# Patient Record
Sex: MALE | Race: WHITE | NOT HISPANIC OR LATINO | Employment: UNEMPLOYED | URBAN - NONMETROPOLITAN AREA
[De-identification: names, ages, dates, MRNs, and addresses within clinical notes are randomized per-mention and may not be internally consistent; named-entity substitution may affect disease eponyms.]

---

## 2017-05-24 ENCOUNTER — OFFICE VISIT (OUTPATIENT)
Dept: UROLOGY | Facility: CLINIC | Age: 62
End: 2017-05-24

## 2017-05-24 DIAGNOSIS — R33.9 INCOMPLETE EMPTYING OF BLADDER: ICD-10-CM

## 2017-05-24 DIAGNOSIS — N40.1 BENIGN NON-NODULAR PROSTATIC HYPERPLASIA WITH LOWER URINARY TRACT SYMPTOMS: ICD-10-CM

## 2017-05-24 DIAGNOSIS — R35.0 URINARY FREQUENCY: Primary | ICD-10-CM

## 2017-05-24 LAB
BILIRUB BLD-MCNC: NEGATIVE MG/DL
CLARITY, POC: CLEAR
COLOR UR: YELLOW
GLUCOSE UR STRIP-MCNC: NEGATIVE MG/DL
KETONES UR QL: NEGATIVE
LEUKOCYTE EST, POC: NEGATIVE
NITRITE UR-MCNC: NEGATIVE MG/ML
PH UR: 5 [PH] (ref 5–8)
PROT UR STRIP-MCNC: ABNORMAL MG/DL
RBC # UR STRIP: NEGATIVE /UL
SP GR UR: 1.01 (ref 1–1.03)
UROBILINOGEN UR QL: NORMAL

## 2017-05-24 PROCEDURE — 51798 US URINE CAPACITY MEASURE: CPT | Performed by: UROLOGY

## 2017-05-24 PROCEDURE — 81003 URINALYSIS AUTO W/O SCOPE: CPT | Performed by: UROLOGY

## 2017-05-24 PROCEDURE — 99203 OFFICE O/P NEW LOW 30 MIN: CPT | Performed by: UROLOGY

## 2019-07-22 DIAGNOSIS — R06.02 SOB (SHORTNESS OF BREATH): Primary | ICD-10-CM

## 2019-07-29 ENCOUNTER — HOSPITAL ENCOUNTER (OUTPATIENT)
Dept: GENERAL RADIOLOGY | Facility: HOSPITAL | Age: 64
Discharge: HOME | End: 2019-07-29
Admitting: INTERNAL MEDICINE

## 2019-07-29 PROCEDURE — 71046 X-RAY EXAM CHEST 2 VIEWS: CPT

## 2019-07-29 PROCEDURE — 71046 X-RAY EXAM CHEST 2 VIEWS: CPT | Performed by: RADIOLOGY

## 2019-07-30 ENCOUNTER — OFFICE VISIT (OUTPATIENT)
Dept: PULMONOLOGY | Facility: CLINIC | Age: 64
End: 2019-07-30

## 2019-07-30 VITALS
BODY MASS INDEX: 27.55 KG/M2 | HEART RATE: 71 BPM | SYSTOLIC BLOOD PRESSURE: 129 MMHG | DIASTOLIC BLOOD PRESSURE: 77 MMHG | HEIGHT: 69 IN | OXYGEN SATURATION: 95 % | WEIGHT: 186 LBS | TEMPERATURE: 98 F

## 2019-07-30 DIAGNOSIS — J60 CWP (COALWORKERS PNEUMOCONIOSIS) (HCC): Primary | ICD-10-CM

## 2019-07-30 LAB
FEV1: 2.61 LITERS
FVC VOL RESPIRATORY: 3.46 LITERS

## 2019-07-30 PROCEDURE — 94010 BREATHING CAPACITY TEST: CPT | Performed by: INTERNAL MEDICINE

## 2019-07-30 PROCEDURE — 99202 OFFICE O/P NEW SF 15 MIN: CPT | Performed by: INTERNAL MEDICINE

## 2019-07-30 RX ORDER — LEVOTHYROXINE SODIUM 0.03 MG/1
TABLET ORAL
COMMUNITY
Start: 2019-07-01

## 2019-07-30 RX ORDER — TAMSULOSIN HYDROCHLORIDE 0.4 MG/1
CAPSULE ORAL
COMMUNITY
Start: 2019-07-01 | End: 2022-10-27

## 2019-07-30 RX ORDER — HYDROXYUREA 200 MG/1
CAPSULE ORAL
COMMUNITY
Start: 2019-07-10 | End: 2023-01-25

## 2019-07-30 RX ORDER — TERBINAFINE HYDROCHLORIDE 250 MG/1
TABLET ORAL
COMMUNITY
Start: 2019-06-07

## 2019-07-30 RX ORDER — METOPROLOL SUCCINATE 100 MG/1
TABLET, EXTENDED RELEASE ORAL
COMMUNITY
Start: 2019-07-01

## 2019-07-30 RX ORDER — FENOFIBRATE 145 MG/1
TABLET, COATED ORAL
COMMUNITY

## 2019-07-30 RX ORDER — HYDROXYUREA 500 MG/1
CAPSULE ORAL
COMMUNITY
Start: 2019-07-01 | End: 2023-01-25

## 2019-07-30 RX ORDER — LISINOPRIL 2.5 MG/1
TABLET ORAL
COMMUNITY
End: 2023-01-25

## 2019-07-30 ASSESSMENT — PULMONARY FUNCTION TESTS
FVC: 3.46
FEV1: 2.61

## 2019-07-30 NOTE — PROGRESS NOTES
Subjective   Chief Complaint   Patient presents with   • Black Lung       Zane De Leon is a 63 y.o. 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 Morales in [place] for diabetes and hypertension and gives history of having polycythemia,.  On metformin and lisinopril and Ventolin inhaler    Review of Systems minimal cough and some shortness of breath on exertion uses Ventolin inhaler occasionally    Family History   Problem Relation Age of Onset   • Heart disease Father    • Hypertension Father    • Heart disease Mother    • Hypertension Mother    • Kidney disease Mother    • Heart disease Sister        Past Medical History:   Diagnosis Date   • Acid reflux    • Diabetes mellitus (CMS/HCC)    • High cholesterol    • Hypertension        No past surgical history on file.    Social History     Socioeconomic History   • Marital status:      Spouse name: Not on file   • Number of children: Not on file   • Years of education: Not on file   • Highest education level: Not on file   Tobacco Use   • Smoking status: Former Smoker   • Smokeless tobacco: Former User   Substance and Sexual Activity   • Alcohol use: No   • Drug use: No        Physical Exam: No acute distress vital signs are stable blood pressure 109/77 O2 sat 95% lungs clear heart regular no clubbing cyanosis or edema    PFT: Borderline normal FVC 78 FEV1 78%    Imaging: Chest x-ray shows a small densities throughout both lung PQ type and rate of 2/3    Other Labs:       ASSESSMENT: Workers pneumoconiosis stage II/III        Recommendations: Continue albuterol inhaler as needed reminded of getting Flu vaccine annually pneumonia vaccine per protocol    Follow up: Dr. Haji of the  family physician in [place] suggested getting chest x-ray once a year in August after his birthday and return to our office as needed I explained to him that I will not be working next year in August during the office somebody else will be here

## 2022-05-26 ENCOUNTER — OFFICE VISIT (OUTPATIENT)
Dept: PULMONOLOGY | Facility: CLINIC | Age: 67
End: 2022-05-26

## 2022-05-26 VITALS
WEIGHT: 188 LBS | HEIGHT: 69 IN | DIASTOLIC BLOOD PRESSURE: 82 MMHG | OXYGEN SATURATION: 98 % | SYSTOLIC BLOOD PRESSURE: 122 MMHG | BODY MASS INDEX: 27.85 KG/M2 | TEMPERATURE: 97.7 F | HEART RATE: 75 BPM

## 2022-05-26 DIAGNOSIS — R53.83 FATIGUE, UNSPECIFIED TYPE: ICD-10-CM

## 2022-05-26 DIAGNOSIS — D45 POLYCYTHEMIA VERA: ICD-10-CM

## 2022-05-26 DIAGNOSIS — J60 BLACK LUNG: Primary | ICD-10-CM

## 2022-05-26 PROCEDURE — 99214 OFFICE O/P EST MOD 30 MIN: CPT | Performed by: INTERNAL MEDICINE

## 2022-05-26 NOTE — PROGRESS NOTES
Subjective    Zane De Leon presents for the following Black Lung      History of Present Illness   Were you born premature?  no    Any Childhood infections? no      Breathing problems when you were a child? no    Any childhood allergies?    no             At what age did you begin smoking? 19    Smoking marijuana? no    Any IV drugs? no    How many packs per day? Socially    Lung Function Test? yes  Chest X-Ray? yes    CT Chest? yes Allergy Test? no    Family hx of Lung disease or Lung Cancer?yes    If FHx is posivitive for lung cancer, what is the relationship of the family member? sister(s)    Any hospitalization in the last year? no    How far can you walk without getting short of breath? 100 feet    Any coughing? yes    Any wheezing? yes    Acid Reflux? yes    Do you snore? yes    Daytime Fatigue? yes    Any pets? yes   Any pet allergies? no    Occupation? Retired     Have you been exposed to any chemicals at your job? yes    What inhalers are you currently using? None    Have you had the Influenza Vaccine? yes    Would you like to receive this Vaccine today? no    Have you had the Pneumonia Vaccine?  yes   Would you like to receive this Vaccine today? no  Above-mentioned questionnaire was reviewed by me in great detail.  Patient last saw pulmonologist roughly 3 years back.  As per the patient he did not have any hospitalization or any exacerbation of his symptoms.  Patient carries a diagnosis of black lung as per the records  Imaging: Chest x-ray shows a small densities throughout both lung PQ type and rate of 2/3   Workers pneumoconiosis stage II/III    In the hematology oncology office patient was told not to use albuterol inhaler as it can cause polycythemia vera.  To which I do not agree.  I asked him to provide us records from the hematology oncology office.  I did tell him that chronic hypoxia can for sure because polycythemia vera.  Patient's pulse ox today was 98%.    Review of Systems    Constitutional: Positive for fatigue. Negative for activity change, appetite change, chills and unexpected weight change.   HENT: Negative for congestion, postnasal drip and rhinorrhea.    Respiratory: Positive for cough, shortness of breath and wheezing. Negative for apnea and chest tightness.    Cardiovascular: Negative for chest pain, palpitations and leg swelling.   Gastrointestinal: Negative for nausea.   Musculoskeletal: Negative for gait problem.   Skin: Negative for pallor.   Allergic/Immunologic: Negative for environmental allergies.   Neurological: Negative for syncope.   Psychiatric/Behavioral: Negative for confusion. The patient is not nervous/anxious.        Active Problems:  Problem List Items Addressed This Visit        Hematology and Neoplasia    Polycythemia vera (HCC)       Pulmonary and Pneumonias    Black lung (HCC) - Primary    Relevant Orders    Full Pulmonary Function Test With Bronchodilator       Symptoms and Signs    Fatigue    Relevant Orders    Home Sleep Study          Past Medical History:  Past Medical History:   Diagnosis Date   • Acid reflux    • Diabetes mellitus (HCC)    • High cholesterol    • Hypertension        Family History:  Family History   Problem Relation Age of Onset   • Heart disease Father    • Hypertension Father    • Heart disease Mother    • Hypertension Mother    • Kidney disease Mother    • Heart disease Sister        Social History:  Social History     Tobacco Use   • Smoking status: Former Smoker   • Smokeless tobacco: Former User   Substance Use Topics   • Alcohol use: No       Current Medications:  Current Outpatient Medications   Medication Sig Dispense Refill   • DROXIA 200 MG capsule      • fenofibrate (TRICOR) 145 MG tablet fenofibrate nanocrystallized 145 mg tablet     • hydroxyurea (HYDREA) 500 MG capsule      • levothyroxine (SYNTHROID, LEVOTHROID) 25 MCG tablet      • lisinopril (PRINIVIL,ZESTRIL) 2.5 MG tablet lisinopril 2.5 mg tablet     • metFORMIN  "(GLUCOPHAGE) 500 MG tablet metformin 500 mg tablet     • metoprolol succinate XL (TOPROL-XL) 100 MG 24 hr tablet      • tamsulosin (FLOMAX) 0.4 MG capsule 24 hr capsule      • terbinafine (lamiSIL) 250 MG tablet        No current facility-administered medications for this visit.       Allergies:  No Known Allergies    Vitals:  /82   Pulse 75   Temp 97.7 °F (36.5 °C) (Temporal)   Ht 175.3 cm (69\")   Wt 85.3 kg (188 lb)   SpO2 98%   BMI 27.76 kg/m²     Imaging:    Imaging Results (Most Recent)     None          Pulmonary Functions Testing Results:    FEV1   Date Value Ref Range Status   07/30/2019 2.61 liters Final     FVC   Date Value Ref Range Status   07/30/2019 3.46 liters Final       Results for orders placed or performed in visit on 07/30/19   Pulmonary Function Test   Result Value Ref Range    FEV1 2.61 liters    FVC 3.46 liters       Objective   Physical Exam   General- normal in appearance, not in any acute distress    HEENT- pupils equally reactive to light, normal in size, no scleral icterus    Neck-supple    Respiratory-respirations normal-on auscultation no wheezing no crackles,     Cardiovascular-  Normal S1 and S2. No S3, S4 or murmurs. No JVD, no carotid bruit and no edema, pulses normal bilaterally     GI-nontender nondistended bowel sounds positive    CNS-nonfocal    Musculoskeletal -no edema  Extremities- no obvious deformity noticed     Psychiatric-mood good, good eye contact, alert awake oriented  Skin- no visible rash         Assessment & Plan      Black lung-as per the records-  Imaging: Chest x-ray shows a small densities throughout both lung PQ type and rate of 2/3  Workers pneumoconiosis stage II/III    We will get PFTs to evaluate the lungs and see if patient has any obstructive or restrictive lung disease.    And prescribe him inhalers accordingly    Daytime fatigue and tiredness-will get sleep study.  This will also be helpful if patient is having any nighttime hypoxemia which " can lead to polycythemia vera.    Polycythemia vera-continue current treatment with hematology oncology.  Patient is on hydroxyurea.      ICD-10-CM ICD-9-CM   1. Black lung (HCC)  J60 500   2. Fatigue, unspecified type  R53.83 780.79   3. Polycythemia vera (HCC)  D45 238.4       Return in about 3 months (around 8/26/2022).

## 2022-06-14 ENCOUNTER — HOSPITAL ENCOUNTER (OUTPATIENT)
Dept: RESPIRATORY THERAPY | Facility: HOSPITAL | Age: 67
Discharge: HOME | End: 2022-06-14
Admitting: INTERNAL MEDICINE

## 2022-06-14 VITALS — OXYGEN SATURATION: 99 % | RESPIRATION RATE: 16 BRPM | HEART RATE: 66 BPM

## 2022-06-14 DIAGNOSIS — J60 BLACK LUNG: ICD-10-CM

## 2022-06-14 PROCEDURE — 94060 EVALUATION OF WHEEZING: CPT

## 2022-06-14 PROCEDURE — 94060 EVALUATION OF WHEEZING: CPT | Performed by: INTERNAL MEDICINE

## 2022-06-14 PROCEDURE — 94664 DEMO&/EVAL PT USE INHALER: CPT

## 2022-06-14 PROCEDURE — 94726 PLETHYSMOGRAPHY LUNG VOLUMES: CPT

## 2022-06-14 PROCEDURE — 94640 AIRWAY INHALATION TREATMENT: CPT

## 2022-06-14 PROCEDURE — 94729 DIFFUSING CAPACITY: CPT

## 2022-06-14 PROCEDURE — 94726 PLETHYSMOGRAPHY LUNG VOLUMES: CPT | Performed by: INTERNAL MEDICINE

## 2022-06-14 PROCEDURE — 94729 DIFFUSING CAPACITY: CPT | Performed by: INTERNAL MEDICINE

## 2022-06-14 PROCEDURE — 94799 UNLISTED PULMONARY SVC/PX: CPT

## 2022-06-14 RX ORDER — ALBUTEROL SULFATE 2.5 MG/3ML
2.5 SOLUTION RESPIRATORY (INHALATION) ONCE
Status: COMPLETED | OUTPATIENT
Start: 2022-06-14 | End: 2022-06-14

## 2022-06-14 RX ADMIN — ALBUTEROL SULFATE 2.5 MG: 2.5 SOLUTION RESPIRATORY (INHALATION) at 10:42

## 2022-10-27 ENCOUNTER — OFFICE VISIT (OUTPATIENT)
Dept: PULMONOLOGY | Facility: CLINIC | Age: 67
End: 2022-10-27

## 2022-10-27 VITALS
HEIGHT: 69 IN | SYSTOLIC BLOOD PRESSURE: 118 MMHG | OXYGEN SATURATION: 98 % | HEART RATE: 80 BPM | RESPIRATION RATE: 18 BRPM | DIASTOLIC BLOOD PRESSURE: 72 MMHG | TEMPERATURE: 97.8 F | BODY MASS INDEX: 25.18 KG/M2 | WEIGHT: 170 LBS

## 2022-10-27 DIAGNOSIS — J60 BLACK LUNG: Primary | ICD-10-CM

## 2022-10-27 DIAGNOSIS — D45 POLYCYTHEMIA VERA: ICD-10-CM

## 2022-10-27 DIAGNOSIS — E66.3 OVERWEIGHT: ICD-10-CM

## 2022-10-27 DIAGNOSIS — J44.9 CHRONIC OBSTRUCTIVE PULMONARY DISEASE, UNSPECIFIED COPD TYPE: ICD-10-CM

## 2022-10-27 PROCEDURE — 99214 OFFICE O/P EST MOD 30 MIN: CPT | Performed by: NURSE PRACTITIONER

## 2022-10-27 RX ORDER — ASPIRIN 81 MG/1
81 TABLET ORAL DAILY
COMMUNITY

## 2022-10-27 NOTE — PROGRESS NOTES
"Chief Complaint  black lung (Follow up)    Subjective        Zane De Leon presents to [facility] PULMONARY & CRITICAL CARE MEDICINE  History of Present Illness     Mr. De Leon is a 67 year old male with a medical history significant for GERD, diabetes, hyperlipidemia, hypertension, black lung and polycythemia vera.    He presents today for follow-up on black line.  He states that since his last visit he has had hernia surgery and then has been sick with congestion and cough.  He tells me that he saw his PCP who gave him some medication for this.  He reports that since having surgery and being sick he has not had much of an appetite but is starting to feel better.  He underwent PFT since his last visit which does show a moderate obstruction with no bronchodilator response.  He states that he did not have his sleep study completed.  He states that he feels that he sleeps well at nighttime and does not have issues with this.  He also tells me that he follows with Dr. Francis for his polycythemia vera and underwent a bone marrow test about a month ago.          Objective   Vital Signs:  /72 (BP Location: Left arm, Patient Position: Sitting, Cuff Size: Adult)   Pulse 80   Temp 97.8 °F (36.6 °C) (Temporal)   Resp 18   Ht 175.3 cm (69\")   Wt 77.1 kg (170 lb)   SpO2 98%   BMI 25.10 kg/m²   Estimated body mass index is 25.1 kg/m² as calculated from the following:    Height as of this encounter: 175.3 cm (69\").    Weight as of this encounter: 77.1 kg (170 lb).    BMI is >= 25 and <30. (Overweight) The following options were offered after discussion;: exercise counseling/recommendations and nutrition counseling/recommendations      Physical Exam     GENERAL APPEARANCE: Well developed, well nourished, alert and cooperative, and appears to be in no acute distress.    HEAD: normocephalic. Atraumatic.    EYES: PERRL, EOMI. Vision is grossly intact.    THROAT: Oral cavity and pharynx normal. No " inflammation, swelling, exudate, or lesions.     NECK: Neck supple.  No thyromegaly.    CARDIAC: Normal S1 and S2. No S3, S4 or murmurs. Rhythm is regular.     RESPIRATORY:Bilateral air entry positive. Bilateral diminished breath sounds. No wheezing, crackles or rhonchi noted.    GI: Positive bowel sounds. Soft, nondistended, nontender.     MUSCULOSKELETAL: No significant deformity or joint abnormality. No edema. Peripheral pulses intact. No varicosities.    NEUROLOGICAL: Strength and sensation symmetric and intact throughout.     PSYCHIATRIC: The mental examination revealed the patient was oriented to person, place, and time.     Result Review :  The following data was reviewed by: JOSE Freed on 10/27/2022:               Assessment and Plan   Diagnoses and all orders for this visit:    1. Black lung (HCC) (Primary)    2. Polycythemia vera (HCC)    3. Chronic obstructive pulmonary disease, unspecified COPD type (HCC)  -     Overnight Sleep Oximetry Study; Future    4. Overweight         Imaging was reviewed.  Chest x-ray shows small densities throughout both lungs.  Per Dr. Linton's last note, PQ type and rate 2/3.  Coal workers pneumoconiosis stage II/III.    PFT was reviewed and shows moderate obstruction with no significant bronchodilator response.  He is a former smoker, quitting in 1983.  He tells me that his hematologist told him that he cannot use any inhalers as it was causing his blood levels to go back.  I will obtain records from Dr. Francis and discussed with him the possibility of starting him on inhalers for shortness of breath and COPD.    He did not have sleep study completed.  He tells me that he feels that he does not have any trouble with his sleep.  Explained to him that nighttime hypoxemia can lead to polycythemia..  He is willing to undergo overnight pulse oximetry study to assess oxygen saturations during sleep.      He follows with Dr. Francis for polycythemia vera.  He is on  hydroxyurea.            Follow Up   Return in about 3 months (around 1/27/2023).  Patient was given instructions and counseling regarding his condition or for health maintenance advice. Please see specific information pulled into the AVS if appropriate.

## 2022-11-17 ENCOUNTER — TELEPHONE (OUTPATIENT)
Dept: PULMONOLOGY | Facility: CLINIC | Age: 67
End: 2022-11-17

## 2022-11-17 NOTE — TELEPHONE ENCOUNTER
----- Message from JOSE Freed sent at 11/16/2022  3:54 PM EST -----  Will you let him know that his overnight oxygen study showed no oxygen desaturations that would require him to need oxygen.      ----- Message -----  From: Devonte Higgins Incoming  Sent: 11/16/2022   3:16 PM EST  To: JOSE Freed

## 2022-11-17 NOTE — TELEPHONE ENCOUNTER
Called to speak with patient to report overnight pulse ox study results. Patients results were normal and did not show that he will require oxygen to sleep. No answer and unable to leave a message as there was no voicemail.

## 2023-01-25 ENCOUNTER — OFFICE VISIT (OUTPATIENT)
Dept: PULMONOLOGY | Facility: CLINIC | Age: 68
End: 2023-01-25
Payer: OTHER MISCELLANEOUS

## 2023-01-25 VITALS
BODY MASS INDEX: 25.92 KG/M2 | HEIGHT: 69 IN | OXYGEN SATURATION: 98 % | SYSTOLIC BLOOD PRESSURE: 108 MMHG | TEMPERATURE: 98.2 F | DIASTOLIC BLOOD PRESSURE: 70 MMHG | HEART RATE: 87 BPM | WEIGHT: 175 LBS

## 2023-01-25 DIAGNOSIS — E66.3 OVERWEIGHT: ICD-10-CM

## 2023-01-25 DIAGNOSIS — J44.9 CHRONIC OBSTRUCTIVE PULMONARY DISEASE, UNSPECIFIED COPD TYPE: ICD-10-CM

## 2023-01-25 DIAGNOSIS — D45 POLYCYTHEMIA VERA: ICD-10-CM

## 2023-01-25 DIAGNOSIS — J60 BLACK LUNG: Primary | ICD-10-CM

## 2023-01-25 PROCEDURE — 99214 OFFICE O/P EST MOD 30 MIN: CPT | Performed by: NURSE PRACTITIONER

## 2023-01-25 RX ORDER — OMEPRAZOLE 20 MG/1
CAPSULE, DELAYED RELEASE ORAL
COMMUNITY
Start: 2022-11-22

## 2023-01-25 RX ORDER — FLUOROURACIL 50 MG/G
CREAM TOPICAL
COMMUNITY
Start: 2023-01-23

## 2023-01-25 RX ORDER — LISINOPRIL 20 MG/1
20 TABLET ORAL DAILY
COMMUNITY
Start: 2022-11-22

## 2023-01-25 RX ORDER — ALBUTEROL SULFATE 90 UG/1
2 AEROSOL, METERED RESPIRATORY (INHALATION) EVERY 4 HOURS PRN
Qty: 6.7 G | Refills: 5 | Status: SHIPPED | OUTPATIENT
Start: 2023-01-25

## 2023-01-25 RX ORDER — FINASTERIDE 5 MG/1
5 TABLET, FILM COATED ORAL DAILY
COMMUNITY
Start: 2022-11-22

## 2023-01-25 RX ORDER — TAMSULOSIN HYDROCHLORIDE 0.4 MG/1
1 CAPSULE ORAL DAILY
COMMUNITY
Start: 2022-12-05

## 2023-01-25 NOTE — PROGRESS NOTES
"Chief Complaint  Black lung (HCC)    Subjective        Zane De Leon presents to [facility] PULMONARY & CRITICAL CARE MEDICINE  History of Present Illness     Mr. De Leon is a 67 year old male with a medical history significant for GERD, COPD, coal worker's pneumoconiosis, diabetes, hyperlipidemia, and hypertension.    He presents today for follow up on coal workers pneumoconiosis.  He states that he is doing well.  He reports that his shortness of breath is at baseline.  He tells me that he current does not have any inhalers.  He is a former smoker.    Objective   Vital Signs:  /70 (BP Location: Left arm, Patient Position: Sitting)   Pulse 87   Temp 98.2 °F (36.8 °C)   Ht 175.3 cm (69\")   Wt 79.4 kg (175 lb)   SpO2 98%   BMI 25.84 kg/m²   Estimated body mass index is 25.84 kg/m² as calculated from the following:    Height as of this encounter: 175.3 cm (69\").    Weight as of this encounter: 79.4 kg (175 lb).       BMI is >= 25 and <30. (Overweight) The following options were offered after discussion;: exercise counseling/recommendations and nutrition counseling/recommendations      Physical Exam     GENERAL APPEARANCE: Well developed, well nourished, alert and cooperative, and appears to be in no acute distress.    HEAD: normocephalic. Atraumatic.    EYES: PERRL, EOMI. Vision is grossly intact.    THROAT: Oral cavity and pharynx normal. No inflammation, swelling, exudate, or lesions.     NECK: Neck supple.  No thyromegaly.    CARDIAC: Normal S1 and S2. No S3, S4 or murmurs. Rhythm is regular.     RESPIRATORY:Bilateral air entry positive. Bilateral diminished breath sounds. No wheezing, crackles or rhonchi noted.    GI: Positive bowel sounds. Soft, nondistended, nontender.     MUSCULOSKELETAL: No significant deformity or joint abnormality. No edema. Peripheral pulses intact. No varicosities.    NEUROLOGICAL: Strength and sensation symmetric and intact throughout.     PSYCHIATRIC: The " mental examination revealed the patient was oriented to person, place, and time.     Result Review :  The following data was reviewed by: JOSE Freed on 01/25/2023:                   Assessment and Plan   Diagnoses and all orders for this visit:    1. Black lung (HCC) (Primary)    2. Chronic obstructive pulmonary disease, unspecified COPD type ([facility])    3. Overweight    4. Polycythemia vera ([facility])    Other orders  -     albuterol sulfate  (90 Base) MCG/ACT inhaler; Inhale 2 puffs Every 4 (Four) Hours As Needed for Wheezing.  Dispense: 6.7 g; Refill: 5           Reviewed pulmonary testing.  Will start him on albuterol as needed.    Per Dr. Linton's last note, PQ type and rate 2/3.  Coal workers pneumoconiosis stage II/III.    Overnight pulse oximetry was reviewed and noted to be normal.  He does not qualify for oxygen at this time.    He follows with Dr. Francis for polycythemia vera.       Follow Up   Return in about 6 months (around 7/25/2023).  Patient was given instructions and counseling regarding his condition or for health maintenance advice. Please see specific information pulled into the AVS if appropriate.

## 2023-02-02 ENCOUNTER — TELEPHONE (OUTPATIENT)
Dept: PULMONOLOGY | Facility: CLINIC | Age: 68
End: 2023-02-02
Payer: MEDICARE

## 2023-02-02 NOTE — TELEPHONE ENCOUNTER
----- Message from JOSE Freed sent at 2/2/2023 11:41 AM EST -----  Will you let him know that his overnight pulse oximetry was normal and he does not need oxygen.  ----- Message -----  From: Devonte Higgins Incoming  Sent: 2/1/2023   1:23 PM EST  To: JOSE Freed

## 2023-07-27 ENCOUNTER — OFFICE VISIT (OUTPATIENT)
Dept: PULMONOLOGY | Facility: CLINIC | Age: 68
End: 2023-07-27
Payer: OTHER MISCELLANEOUS

## 2023-07-27 VITALS
TEMPERATURE: 97.8 F | SYSTOLIC BLOOD PRESSURE: 118 MMHG | BODY MASS INDEX: 26.96 KG/M2 | WEIGHT: 182 LBS | HEIGHT: 69 IN | HEART RATE: 74 BPM | DIASTOLIC BLOOD PRESSURE: 70 MMHG | OXYGEN SATURATION: 97 %

## 2023-07-27 DIAGNOSIS — J44.9 CHRONIC OBSTRUCTIVE PULMONARY DISEASE, UNSPECIFIED COPD TYPE: ICD-10-CM

## 2023-07-27 DIAGNOSIS — D45 POLYCYTHEMIA VERA: ICD-10-CM

## 2023-07-27 DIAGNOSIS — J60 BLACK LUNG: Primary | ICD-10-CM

## 2023-07-27 DIAGNOSIS — E66.3 OVERWEIGHT: ICD-10-CM

## 2023-07-27 RX ORDER — DAPAGLIFLOZIN 10 MG/1
1 TABLET, FILM COATED ORAL DAILY
COMMUNITY
Start: 2023-06-07

## 2023-07-27 NOTE — PROGRESS NOTES
"Chief Complaint  black lung    Subjective        Zane De Leon presents to [facility] PULMONARY & CRITICAL CARE MEDICINE  History of Present Illness    Mr. De Leon is a 67 year old male with a medical history significant for black lung, COPD, diabetes, and hypertension.    He presents today for follow up on black lung and COPD. He states that for the most part his breathing has been at baseline. He does report shortness of breath with exertion.  He has has no exacerbations since his last visit.  He is currently using albuterol as needed.        Objective   Vital Signs:  /70 (BP Location: Left arm, Patient Position: Sitting)   Pulse 74   Temp 97.8 °F (36.6 °C)   Ht 175.3 cm (69\")   Wt 82.6 kg (182 lb)   SpO2 97%   BMI 26.88 kg/m²   Estimated body mass index is 26.88 kg/m² as calculated from the following:    Height as of this encounter: 175.3 cm (69\").    Weight as of this encounter: 82.6 kg (182 lb).               Physical Exam     GENERAL APPEARANCE: Well developed, well nourished, alert and cooperative, and appears to be in no acute distress.    HEAD: normocephalic. Atraumatic.    EYES: PERRL, EOMI. Vision is grossly intact.    THROAT: Oral cavity and pharynx normal. No inflammation, swelling, exudate, or lesions.     NECK: Neck supple.  No thyromegaly.    CARDIAC: Normal S1 and S2. No S3, S4 or murmurs. Rhythm is regular.     RESPIRATORY:Bilateral air entry positive. Bilateral diminished breath sounds. No wheezing, crackles or rhonchi noted.    GI: Positive bowel sounds. Soft, nondistended, nontender.     MUSCULOSKELETAL: No significant deformity or joint abnormality. No edema. Peripheral pulses intact. No varicosities.    NEUROLOGICAL: Strength and sensation symmetric and intact throughout.     PSYCHIATRIC: The mental examination revealed the patient was oriented to person, place, and time.     Result Review :  The following data was reviewed by: JOSE Freed on " 07/27/2023:                 Assessment and Plan   Diagnoses and all orders for this visit:    1. Black lung (Primary)    2. Chronic obstructive pulmonary disease, unspecified COPD type    3. Overweight    4. Polycythemia vera    Other orders  -     tiotropium bromide-olodaterol (STIOLTO RESPIMAT) 2.5-2.5 MCG/ACT aerosol solution inhaler; Inhale 2 puffs Daily.  Dispense: 4 g; Refill: 5          Per Dr. Linton's last note, PQ type and rate 2/3.  Coal workers pneumoconiosis stage II/III.      PFT shows a moderate obstruction with no significant bronchodilator response.    Continue albuterol as needed.  Stated him on stiolto once daily.    No oxygen desaturations were noted on overnight pulse oximetry study.    He follows with Dr. Francis for polycythemia vera.         Follow Up   Return in about 6 months (around 1/27/2024).  Patient was given instructions and counseling regarding his condition or for health maintenance advice. Please see specific information pulled into the AVS if appropriate.

## 2024-01-25 ENCOUNTER — OFFICE VISIT (OUTPATIENT)
Dept: PULMONOLOGY | Facility: CLINIC | Age: 69
End: 2024-01-25
Payer: OTHER MISCELLANEOUS

## 2024-01-25 VITALS
SYSTOLIC BLOOD PRESSURE: 110 MMHG | DIASTOLIC BLOOD PRESSURE: 78 MMHG | HEIGHT: 69 IN | OXYGEN SATURATION: 96 % | WEIGHT: 185 LBS | BODY MASS INDEX: 27.4 KG/M2 | HEART RATE: 98 BPM | TEMPERATURE: 96.9 F

## 2024-01-25 DIAGNOSIS — J44.9 CHRONIC OBSTRUCTIVE PULMONARY DISEASE, UNSPECIFIED COPD TYPE: ICD-10-CM

## 2024-01-25 DIAGNOSIS — J60 BLACK LUNG: Primary | ICD-10-CM

## 2024-01-25 DIAGNOSIS — D45 POLYCYTHEMIA VERA: ICD-10-CM

## 2024-01-25 DIAGNOSIS — E66.3 OVERWEIGHT: ICD-10-CM

## 2024-01-25 RX ORDER — METHYLPREDNISOLONE 4 MG/1
TABLET ORAL
COMMUNITY
Start: 2023-10-31

## 2024-01-25 RX ORDER — FOLIC ACID 1 MG/1
1 TABLET ORAL DAILY
COMMUNITY

## 2024-01-25 RX ORDER — LISINOPRIL 10 MG/1
1 TABLET ORAL DAILY
COMMUNITY
Start: 2024-01-23

## 2024-01-25 RX ORDER — METFORMIN HYDROCHLORIDE 500 MG/1
500 TABLET, FILM COATED, EXTENDED RELEASE ORAL
COMMUNITY

## 2024-01-25 RX ORDER — FENOFIBRATE 145 MG/1
145 TABLET, COATED ORAL DAILY
COMMUNITY

## 2024-01-25 RX ORDER — PRAVASTATIN SODIUM 10 MG
1 TABLET ORAL DAILY
COMMUNITY
Start: 2024-01-23

## 2024-01-25 NOTE — PROGRESS NOTES
"Chief Complaint  Black lung    Subjective        Zane De Leon presents to [facility] PULMONARY & CRITICAL CARE MEDICINE  History of Present Illness    Mr. De Leon is a 68 year old male with a medical history significant for COPD, black lung, diabetes, hyperlipidemia, and hypertension.    He presents today for follow up on black lung and COPD.  He states that he has been doing well since he was last seen.  He reports that his breathing has been at baseline.  He states that the only time he has trouble is in the cold air. He reports that when he goes out in the cold air his lung hurt.  He is currently taking Stiolto once daily and albuterol as needed.              Objective   Vital Signs:  /78   Pulse 98   Temp 96.9 °F (36.1 °C)   Ht 175.3 cm (69.02\")   Wt 83.9 kg (185 lb)   SpO2 96%   BMI 27.31 kg/m²   Estimated body mass index is 27.31 kg/m² as calculated from the following:    Height as of this encounter: 175.3 cm (69.02\").    Weight as of this encounter: 83.9 kg (185 lb).       BMI is >= 25 and <30. (Overweight) The following options were offered after discussion;: exercise counseling/recommendations and nutrition counseling/recommendations      Physical Exam     GENERAL APPEARANCE: Well developed, well nourished, alert and cooperative, and appears to be in no acute distress.    HEAD: normocephalic. Atraumatic.    EYES: PERRL, EOMI. Vision is grossly intact.    THROAT: Oral cavity and pharynx normal. No inflammation, swelling, exudate, or lesions.     NECK: Neck supple.  No thyromegaly.    CARDIAC: Normal S1 and S2. No S3, S4 or murmurs. Rhythm is regular.     RESPIRATORY:Bilateral air entry positive. Bilateral diminished breath sounds. No wheezing, crackles or rhonchi noted.    GI: Positive bowel sounds. Soft, nondistended, nontender.     MUSCULOSKELETAL: No significant deformity or joint abnormality. No edema. Peripheral pulses intact. No varicosities.    NEUROLOGICAL: Strength and " sensation symmetric and intact throughout.     PSYCHIATRIC: The mental examination revealed the patient was oriented to person, place, and time.     Result Review :    The following data was reviewed by: JOSE Freed on 01/25/2024:                 Assessment and Plan     Diagnoses and all orders for this visit:    1. Black lung (Primary)    2. Chronic obstructive pulmonary disease, unspecified COPD type    3. Overweight    4. Polycythemia vera    Other orders  -     tiotropium bromide-olodaterol (STIOLTO RESPIMAT) 2.5-2.5 MCG/ACT aerosol solution inhaler; Inhale 2 puffs Daily.  Dispense: 4 g; Refill: 5        Per Dr. Linton's last note, PQ type and rate 2/3.  Coal workers pneumoconiosis stage II/III.  PFT shows a moderate obstruction with no significant bronchodilator response.      Continue albuterol as needed.  Continue Stiolto once daily.    Sent in refills.    He continues to follow with Dr. Francis for polycythemia.        Follow Up     Return in about 6 months (around 7/25/2024).  Patient was given instructions and counseling regarding his condition or for health maintenance advice. Please see specific information pulled into the AVS if appropriate.

## 2024-07-25 ENCOUNTER — OFFICE VISIT (OUTPATIENT)
Dept: PULMONOLOGY | Facility: CLINIC | Age: 69
End: 2024-07-25
Payer: OTHER MISCELLANEOUS

## 2024-07-25 VITALS
OXYGEN SATURATION: 97 % | RESPIRATION RATE: 18 BRPM | HEART RATE: 79 BPM | DIASTOLIC BLOOD PRESSURE: 80 MMHG | TEMPERATURE: 98 F | SYSTOLIC BLOOD PRESSURE: 122 MMHG | HEIGHT: 69 IN | WEIGHT: 185 LBS | BODY MASS INDEX: 27.4 KG/M2

## 2024-07-25 DIAGNOSIS — E66.3 OVERWEIGHT: ICD-10-CM

## 2024-07-25 DIAGNOSIS — J44.9 CHRONIC OBSTRUCTIVE PULMONARY DISEASE, UNSPECIFIED COPD TYPE: ICD-10-CM

## 2024-07-25 DIAGNOSIS — J60 BLACK LUNG: Primary | ICD-10-CM

## 2024-07-25 DIAGNOSIS — D45 POLYCYTHEMIA VERA: ICD-10-CM

## 2024-07-25 RX ORDER — ALBUTEROL SULFATE 90 UG/1
2 AEROSOL, METERED RESPIRATORY (INHALATION) EVERY 4 HOURS PRN
Qty: 18 G | Refills: 5 | Status: SHIPPED | OUTPATIENT
Start: 2024-07-25

## 2024-07-25 NOTE — PROGRESS NOTES
"Chief Complaint  Black lung    Subjective        Zane De Leon presents to [facility] PULMONARY & CRITICAL CARE MEDICINE  History of Present Illness    Mr. De Leon is a 68 year old male with a medical history significant for COPD, CWP, diabetes, and hypertension.    He presents today for follow up on CWP.  He reports that he has been doing well since his last visit.  He states that his breathing has been at baseline.  He states that he hasn't been able to do much in the heat.  He is currently taking Stiolto once daily and albuterol as needed.        Objective   Vital Signs:  /80   Pulse 79   Temp 98 °F (36.7 °C) (Temporal)   Resp 18   Ht 175.3 cm (69.02\")   Wt 83.9 kg (185 lb)   SpO2 97%   BMI 27.31 kg/m²   Estimated body mass index is 27.31 kg/m² as calculated from the following:    Height as of this encounter: 175.3 cm (69.02\").    Weight as of this encounter: 83.9 kg (185 lb).               Physical Exam     GENERAL APPEARANCE: Well developed, well nourished, alert and cooperative, and appears to be in no acute distress.    HEAD: normocephalic. Atraumatic.    EYES: PERRL, EOMI. Vision is grossly intact.    THROAT: Oral cavity and pharynx normal. No inflammation, swelling, exudate, or lesions.     NECK: Neck supple.  No thyromegaly.    CARDIAC: Normal S1 and S2. No S3, S4 or murmurs. Rhythm is regular.     RESPIRATORY:Bilateral air entry positive.  No wheezing, crackles or rhonchi noted.    GI: Positive bowel sounds. Soft, nondistended, nontender.     MUSCULOSKELETAL: No significant deformity or joint abnormality. No edema. Peripheral pulses intact. No varicosities.    NEUROLOGICAL: Strength and sensation symmetric and intact throughout.     PSYCHIATRIC: The mental examination revealed the patient was oriented to person, place, and time.     Result Review :    The following data was reviewed by: JOSE Freed on 07/25/2024:                 Assessment and Plan "     Diagnoses and all orders for this visit:    1. Black lung (Primary)    2. Chronic obstructive pulmonary disease, unspecified COPD type    3. Overweight    4. Polycythemia vera    Other orders  -     tiotropium bromide-olodaterol (STIOLTO RESPIMAT) 2.5-2.5 MCG/ACT aerosol solution inhaler; Inhale 2 puffs Daily.  Dispense: 4 g; Refill: 5  -     albuterol sulfate  (90 Base) MCG/ACT inhaler; Inhale 2 puffs Every 4 (Four) Hours As Needed for Wheezing.  Dispense: 18 g; Refill: 5             Per Dr. Linton's last note, PQ type and rate 2/3.  Coal workers pneumoconiosis stage II/III.  PFT shows a moderate obstruction with no significant bronchodilator response.    Continue albtuerol inhaler as needed.  Continue Stiolto once daily.    He continues to follow with Dr. Francis for polycythemia.    We discussed diet and exercise.      Follow Up     Return in about 6 months (around 1/25/2025).  Patient was given instructions and counseling regarding his condition or for health maintenance advice. Please see specific information pulled into the AVS if appropriate.

## 2025-01-29 ENCOUNTER — OFFICE VISIT (OUTPATIENT)
Dept: PULMONOLOGY | Facility: CLINIC | Age: 70
End: 2025-01-29
Payer: OTHER MISCELLANEOUS

## 2025-01-29 VITALS
TEMPERATURE: 97.5 F | DIASTOLIC BLOOD PRESSURE: 68 MMHG | HEART RATE: 74 BPM | HEIGHT: 69 IN | BODY MASS INDEX: 27.05 KG/M2 | WEIGHT: 182.6 LBS | SYSTOLIC BLOOD PRESSURE: 120 MMHG | OXYGEN SATURATION: 98 %

## 2025-01-29 DIAGNOSIS — J60 COAL WORKERS' PNEUMOCONIOSIS: Primary | ICD-10-CM

## 2025-01-29 DIAGNOSIS — J44.9 CHRONIC OBSTRUCTIVE PULMONARY DISEASE, UNSPECIFIED COPD TYPE: ICD-10-CM

## 2025-01-29 RX ORDER — ALBUTEROL SULFATE 90 UG/1
2 INHALANT RESPIRATORY (INHALATION) EVERY 4 HOURS PRN
Qty: 18 G | Refills: 5 | Status: SHIPPED | OUTPATIENT
Start: 2025-01-29

## 2025-01-29 RX ORDER — APIXABAN 5 MG/1
TABLET, FILM COATED ORAL
COMMUNITY
Start: 2024-12-20

## 2025-01-29 RX ORDER — ISOSORBIDE MONONITRATE 30 MG/1
TABLET, EXTENDED RELEASE ORAL
COMMUNITY
Start: 2025-01-22

## 2025-01-29 NOTE — PROGRESS NOTES
"Chief Complaint  Black lung    Subjective          Zane De Leon presents to [facility] PULMONARY & CRITICAL CARE MEDICINE for   History of Present Illness    Mr. De Leon is a 69 year old male with a medical history significant for Coal worker's pneumoconiosis, COPD, diabetes, and hypertension.    He presents today for follow up on Coal Worker's pneumoconiosis.  He reports that he has been doing well since his last visit.  He reports that her breathing has been good but that the cold weather does seem to make it worse.  He is currently taking Stiotlo once daily and albuterol as needed. He also continues to follow with Dr. Francis for polycythemia.        Objective   Vital Signs:   /68   Pulse 74   Temp 97.5 °F (36.4 °C)   Ht 175.3 cm (69.02\")   Wt 82.8 kg (182 lb 9.6 oz)   SpO2 98%   BMI 26.95 kg/m²         Physical Exam    GENERAL APPEARANCE: Well developed, well nourished, alert and cooperative, and appears to be in no acute distress.    HEAD: normocephalic. Atraumatic.    EYES: PERRL, EOMI. Vision is grossly intact.    THROAT: Oral cavity and pharynx normal. No inflammation, swelling, exudate, or lesions.     NECK: Neck supple.  No thyromegaly.    CARDIAC: Normal S1 and S2. No S3, S4 or murmurs. Rhythm is regular.     RESPIRATORY:Bilateral air entry positive.  No wheezing, crackles or rhonchi noted.    GI: Positive bowel sounds. Soft, nondistended, nontender.     MUSCULOSKELETAL: No significant deformity or joint abnormality. No edema. Peripheral pulses intact. No varicosities.    NEUROLOGICAL: Strength and sensation symmetric and intact throughout.     PSYCHIATRIC: The mental examination revealed the patient was oriented to person, place, and time.       Estimated body mass index is 26.95 kg/m² as calculated from the following:    Height as of this encounter: 175.3 cm (69.02\").    Weight as of this encounter: 82.8 kg (182 lb 9.6 oz).        Result Review :   The following data was " "reviewed by: JOSE Freed on 01/29/2025:         PFT:6/14/22  Spirometry shows moderate obstruction with no significant bronchodilator effect seen on this occasion. Diffusion capacity is mildly reduced. Lung volumes are borderline normal. Flow volume loop is obstructed.       Low dose lung cancer screening:NA    Previous chest imaging:    Chest xray 12/19/24      Impression       Similar diffuse bilateral nodular interstitial opacities, suggesting chronic granulomatous disease. No new focal consolidation.     Created by: Marco A Moore MD   Signed by: Marco A Moore MD   Signed on: 12/19/2024 10:35 PM   Location: [id number]            Narrative        EXAM: XR CHEST 1 VIEW       INDICATION: Atrial fibrillation.     COMPARISON: Chest radiograph 02/04/2021. CT chest 02/02/2011.     FINDINGS:     LUNGS/PLEURA: Similar diffuse bilateral nodular interstitial opacities. No new focal consolidation or pleural effusion.     PNEUMOTHORAX: None.     HEART/MEDIASTINUM: Mildly prominent cardiac silhouette.     SUPPORT DEVICES: None.     OSSEOUS STRUCTURES: No acute fracture or destructive lesion.  Alpha-1 antitrypsin screening:NA    STOP-Bang Score:   NA  [place] Sleepiness Scale:   NA      ABG:    pH No results found for: \"PHART\"   pO2 No results found for: \"PO2ART\"   pCO2 No results found for: \"WWM0JOD\"   HCO3 No results found for: \"JAQ0ZHM\"                      Assessment and Plan    Problem List Items Addressed This Visit    None  Visit Diagnoses       Coal workers' pneumoconiosis    -  Primary    Relevant Medications    tiotropium bromide-olodaterol (STIOLTO RESPIMAT) 2.5-2.5 MCG/ACT aerosol solution inhaler    albuterol sulfate  (90 Base) MCG/ACT inhaler    Chronic obstructive pulmonary disease, unspecified COPD type        Relevant Medications    tiotropium bromide-olodaterol (STIOLTO RESPIMAT) 2.5-2.5 MCG/ACT aerosol solution inhaler    albuterol sulfate  (90 Base) MCG/ACT inhaler            Zane" Haley  reports that he quit smoking about 42 years ago. His smoking use included cigarettes. He started smoking about 52 years ago. He has a 10 pack-year smoking history. He has been exposed to tobacco smoke. He has quit using smokeless tobacco.        Per Dr. Linton's last note, PQ type and rate 2/3.  Coal workers pneumoconiosis stage II/III.    Continue albuterol inhaler as needed.  Continue Stiolto once daily.  Sent in refills.      He continues to follow with Dr. Francis for polycythemia.        Follow Up   Return in about 6 months (around 7/29/2025).  Patient was given instructions and counseling regarding his condition or for health maintenance advice. Please see specific information pulled into the AVS if appropriate.

## 2025-01-30 ENCOUNTER — PATIENT ROUNDING (BHMG ONLY) (OUTPATIENT)
Dept: PULMONOLOGY | Facility: CLINIC | Age: 70
End: 2025-01-30
Payer: MEDICARE

## 2025-01-30 NOTE — PROGRESS NOTES
January 30, 2025    Hello, may I speak with Zane De Leon?    My name is Nadia Koenig      I am  with [facility] PULMONARY & CRITICAL CARE MEDICINE  [address]  [place] [address] [phone].    Before we get started may I verify your date of birth? 1955    I am calling to officially welcome you to our practice and ask about your recent visit. Is this a good time to talk? yes    Tell me about your visit with us. What things went well?  Nice bunch of girls. Very kind and helpful        We're always looking for ways to make our patients' experiences even better. Do you have recommendations on ways we may improve?  no    Overall were you satisfied with your first visit to our practice? yes       I appreciate you taking the time to speak with me today. Is there anything else I can do for you? no      Thank you, and have a great day.

## 2025-07-29 RX ORDER — TIOTROPIUM BROMIDE AND OLODATEROL 3.124; 2.736 UG/1; UG/1
2 SPRAY, METERED RESPIRATORY (INHALATION) DAILY
Qty: 4 G | Refills: 5 | Status: SHIPPED | OUTPATIENT
Start: 2025-07-29

## 2025-07-30 ENCOUNTER — OFFICE VISIT (OUTPATIENT)
Dept: PULMONOLOGY | Facility: CLINIC | Age: 70
End: 2025-07-30
Payer: OTHER MISCELLANEOUS

## 2025-07-30 VITALS
SYSTOLIC BLOOD PRESSURE: 126 MMHG | DIASTOLIC BLOOD PRESSURE: 72 MMHG | WEIGHT: 178.6 LBS | TEMPERATURE: 97.7 F | BODY MASS INDEX: 26.45 KG/M2 | HEIGHT: 69 IN | HEART RATE: 73 BPM | OXYGEN SATURATION: 97 %

## 2025-07-30 DIAGNOSIS — J60 COAL WORKERS' PNEUMOCONIOSIS: Primary | ICD-10-CM

## 2025-07-30 DIAGNOSIS — J44.9 CHRONIC OBSTRUCTIVE PULMONARY DISEASE, UNSPECIFIED COPD TYPE: ICD-10-CM

## 2025-07-30 RX ORDER — EZETIMIBE 10 MG/1
10 TABLET ORAL DAILY
COMMUNITY

## 2025-07-30 RX ORDER — ISOSORBIDE MONONITRATE 60 MG/1
TABLET, EXTENDED RELEASE ORAL
COMMUNITY

## 2025-07-30 RX ORDER — ATORVASTATIN CALCIUM 20 MG/1
TABLET, FILM COATED ORAL
COMMUNITY

## 2025-07-30 RX ORDER — ALBUTEROL SULFATE 90 UG/1
2 INHALANT RESPIRATORY (INHALATION) EVERY 4 HOURS PRN
Qty: 18 G | Refills: 5 | Status: SHIPPED | OUTPATIENT
Start: 2025-07-30

## 2025-07-30 RX ORDER — TAMSULOSIN HYDROCHLORIDE 0.4 MG/1
CAPSULE ORAL
COMMUNITY

## 2025-07-30 NOTE — PROGRESS NOTES
"Chief Complaint  Coal workers' pneumoconiosis    Subjective          Zane De Leon presents to [facility] PULMONARY & CRITICAL CARE MEDICINE for   History of Present Illness    Mr. De Leon is a 69 year old female with a medical history significant for GERD, COPD, CWP, hypertension, and diabetes.    He presents today for follow up on CWP.  He reports that he has been doing well since his last visit.  He reports that his breathing has been at baseline. He is currently taking Stiolto once daily and albuterol inhaler as needed.    Objective   Vital Signs:   /72   Pulse 73   Temp 97.7 °F (36.5 °C)   Ht 175.3 cm (69\")   Wt 81 kg (178 lb 9.6 oz)   SpO2 97%   BMI 26.37 kg/m²         Physical Exam    GENERAL APPEARANCE: Well developed, well nourished, alert and cooperative, and appears to be in no acute distress.    HEAD: normocephalic. Atraumatic.    EYES: PERRL, EOMI. Vision is grossly intact.    THROAT: Oral cavity and pharynx normal. No inflammation, swelling, exudate, or lesions.     NECK: Neck supple.  No thyromegaly.    CARDIAC: Normal S1 and S2. No S3, S4 or murmurs. Rhythm is regular.     RESPIRATORY:Bilateral air entry positive. No wheezing, crackles or rhonchi noted.    GI: Positive bowel sounds. Soft, nondistended, nontender.     MUSCULOSKELETAL: No significant deformity or joint abnormality. No edema. Peripheral pulses intact. No varicosities.    NEUROLOGICAL: Strength and sensation symmetric and intact throughout.     PSYCHIATRIC: The mental examination revealed the patient was oriented to person, place, and time.       Estimated body mass index is 26.37 kg/m² as calculated from the following:    Height as of this encounter: 175.3 cm (69\").    Weight as of this encounter: 81 kg (178 lb 9.6 oz).        Result Review :   The following data was reviewed by: JOSE Freed on 07/30/2025:       PFT:6/14/22  Spirometry shows moderate obstruction with no significant " "bronchodilator effect seen on this occasion. Diffusion capacity is mildly reduced. Lung volumes are borderline normal. Flow volume loop is obstructed.         Low dose lung cancer screening:NA     Previous chest imaging:     Chest xray 12/19/24        Impression        Similar diffuse bilateral nodular interstitial opacities, suggesting chronic granulomatous disease. No new focal consolidation.     Created by: Marco A Moore MD   Signed by: Marco A Moore MD   Signed on: 12/19/2024 10:35 PM   Location: [place] [address]            Narrative         EXAM: XR CHEST 1 VIEW       INDICATION: Atrial fibrillation.     COMPARISON: Chest radiograph 02/04/2021. CT chest 02/02/2011.     FINDINGS:     LUNGS/PLEURA: Similar diffuse bilateral nodular interstitial opacities. No new focal consolidation or pleural effusion.     PNEUMOTHORAX: None.     HEART/MEDIASTINUM: Mildly prominent cardiac silhouette.     SUPPORT DEVICES: None.     OSSEOUS STRUCTURES: No acute fracture or destructive lesion.  Alpha-1 antitrypsin screening:NA     STOP-Bang Score:   NA  [place] Sleepiness Scale:   NA           ABG:    pH No results found for: \"PHART\"   pO2 No results found for: \"PO2ART\"   pCO2 No results found for: \"OPQ0JUL\"   HCO3 No results found for: \"SIH8QPG\"                      Assessment and Plan    Problem List Items Addressed This Visit    None  Visit Diagnoses         Coal workers' pneumoconiosis    -  Primary    Relevant Medications    albuterol sulfate  (90 Base) MCG/ACT inhaler      Chronic obstructive pulmonary disease, unspecified COPD type        Relevant Medications    albuterol sulfate  (90 Base) MCG/ACT inhaler            Zane De Leon  reports that he quit smoking about 42 years ago. His smoking use included cigarettes. He started smoking about 52 years ago. He has a 10 pack-year smoking history. He has been exposed to tobacco smoke. He has quit using smokeless tobacco.       Per Dr. Linton's last note, PQ type and rate 2/3. "  Coal workers pneumoconiosis stage II/III.    Continue albuterol inhaler as needed.  Continue Stiolto once daily.    He continues to follow with Dr. Francis for polycythemia.         Follow Up   Return in about 6 months (around 1/30/2026).  Patient was given instructions and counseling regarding his condition or for health maintenance advice. Please see specific information pulled into the AVS if appropriate.